# Patient Record
Sex: FEMALE | Race: BLACK OR AFRICAN AMERICAN | NOT HISPANIC OR LATINO
[De-identification: names, ages, dates, MRNs, and addresses within clinical notes are randomized per-mention and may not be internally consistent; named-entity substitution may affect disease eponyms.]

---

## 2017-12-27 ENCOUNTER — APPOINTMENT (OUTPATIENT)
Dept: UROLOGY | Facility: CLINIC | Age: 77
End: 2017-12-27
Payer: COMMERCIAL

## 2017-12-27 VITALS
OXYGEN SATURATION: 94 % | HEIGHT: 62 IN | TEMPERATURE: 98.2 F | WEIGHT: 211 LBS | DIASTOLIC BLOOD PRESSURE: 84 MMHG | SYSTOLIC BLOOD PRESSURE: 140 MMHG | HEART RATE: 90 BPM | BODY MASS INDEX: 38.83 KG/M2

## 2017-12-27 DIAGNOSIS — Z80.0 FAMILY HISTORY OF MALIGNANT NEOPLASM OF DIGESTIVE ORGANS: ICD-10-CM

## 2017-12-27 DIAGNOSIS — Z86.79 PERSONAL HISTORY OF OTHER DISEASES OF THE CIRCULATORY SYSTEM: ICD-10-CM

## 2017-12-27 DIAGNOSIS — Z78.9 OTHER SPECIFIED HEALTH STATUS: ICD-10-CM

## 2017-12-27 PROCEDURE — 99203 OFFICE O/P NEW LOW 30 MIN: CPT

## 2017-12-27 RX ORDER — OMEGA-3-ACID ETHYL ESTERS CAPSULES 1 G/1
1 CAPSULE, LIQUID FILLED ORAL
Qty: 120 | Refills: 0 | Status: ACTIVE | COMMUNITY
Start: 2017-03-08

## 2017-12-27 RX ORDER — LEVOTHYROXINE SODIUM 0.1 MG/1
100 TABLET ORAL
Qty: 30 | Refills: 0 | Status: ACTIVE | COMMUNITY
Start: 2017-05-11

## 2017-12-27 RX ORDER — TROSPIUM CHLORIDE 20 MG/1
20 TABLET, FILM COATED ORAL
Refills: 0 | Status: ACTIVE | COMMUNITY

## 2017-12-27 RX ORDER — IBUPROFEN 800 MG/1
800 TABLET, FILM COATED ORAL
Qty: 90 | Refills: 0 | Status: ACTIVE | COMMUNITY
Start: 2017-03-08

## 2017-12-27 RX ORDER — NYSTATIN AND TRIAMCINOLONE ACETONIDE 100000; 1 MG/G; MG/G
100000-0.1 CREAM TOPICAL
Refills: 0 | Status: ACTIVE | COMMUNITY

## 2017-12-27 RX ORDER — HYDROCHLOROTHIAZIDE 25 MG/1
25 TABLET ORAL
Refills: 0 | Status: ACTIVE | COMMUNITY

## 2017-12-27 RX ORDER — CLONIDINE HYDROCHLORIDE 0.2 MG/1
0.2 TABLET ORAL
Qty: 60 | Refills: 0 | Status: ACTIVE | COMMUNITY
Start: 2017-11-14

## 2017-12-27 RX ORDER — IRBESARTAN 150 MG/1
150 TABLET ORAL
Qty: 30 | Refills: 0 | Status: ACTIVE | COMMUNITY
Start: 2016-12-07

## 2018-01-02 LAB
APPEARANCE: CLEAR
BACTERIA UR CULT: NORMAL
BACTERIA: NEGATIVE
BILIRUBIN URINE: NEGATIVE
BLOOD URINE: NEGATIVE
COLOR: YELLOW
GLUCOSE QUALITATIVE U: NEGATIVE MG/DL
HYALINE CASTS: 1 /LPF
KETONES URINE: NEGATIVE
LEUKOCYTE ESTERASE URINE: NEGATIVE
MICROSCOPIC-UA: NORMAL
NITRITE URINE: NEGATIVE
PH URINE: 6.5
PROTEIN URINE: 300 MG/DL
RED BLOOD CELLS URINE: 4 /HPF
SPECIFIC GRAVITY URINE: 1.02
SQUAMOUS EPITHELIAL CELLS: 6 /HPF
UROBILINOGEN URINE: NEGATIVE MG/DL
WHITE BLOOD CELLS URINE: 6 /HPF

## 2018-03-19 ENCOUNTER — APPOINTMENT (OUTPATIENT)
Dept: UROLOGY | Facility: CLINIC | Age: 78
End: 2018-03-19
Payer: COMMERCIAL

## 2018-03-19 VITALS
DIASTOLIC BLOOD PRESSURE: 80 MMHG | SYSTOLIC BLOOD PRESSURE: 120 MMHG | BODY MASS INDEX: 38.83 KG/M2 | WEIGHT: 211 LBS | HEART RATE: 88 BPM | TEMPERATURE: 98.4 F | HEIGHT: 62 IN | RESPIRATION RATE: 16 BRPM | OXYGEN SATURATION: 96 %

## 2018-03-19 PROCEDURE — 99214 OFFICE O/P EST MOD 30 MIN: CPT

## 2018-03-19 RX ORDER — METFORMIN HYDROCHLORIDE 500 MG/1
500 TABLET, COATED ORAL
Qty: 180 | Refills: 0 | Status: DISCONTINUED | COMMUNITY
Start: 2017-02-13 | End: 2018-03-19

## 2018-03-20 LAB
APPEARANCE: CLEAR
BACTERIA: NEGATIVE
BILIRUBIN URINE: NEGATIVE
BLOOD URINE: NEGATIVE
COLOR: YELLOW
GLUCOSE QUALITATIVE U: NEGATIVE MG/DL
HYALINE CASTS: 0 /LPF
KETONES URINE: NEGATIVE
LEUKOCYTE ESTERASE URINE: NEGATIVE
MICROSCOPIC-UA: NORMAL
NITRITE URINE: NEGATIVE
PH URINE: 5
PROTEIN URINE: ABNORMAL MG/DL
RED BLOOD CELLS URINE: 3 /HPF
SPECIFIC GRAVITY URINE: 1.02
SQUAMOUS EPITHELIAL CELLS: 10 /HPF
UROBILINOGEN URINE: NEGATIVE MG/DL
WHITE BLOOD CELLS URINE: 7 /HPF

## 2018-04-18 ENCOUNTER — OUTPATIENT (OUTPATIENT)
Dept: OUTPATIENT SERVICES | Facility: HOSPITAL | Age: 78
LOS: 1 days | End: 2018-04-18

## 2018-04-18 DIAGNOSIS — Z00.8 ENCOUNTER FOR OTHER GENERAL EXAMINATION: ICD-10-CM

## 2018-05-08 ENCOUNTER — APPOINTMENT (OUTPATIENT)
Dept: MAMMOGRAPHY | Facility: CLINIC | Age: 78
End: 2018-05-08
Payer: COMMERCIAL

## 2018-05-08 ENCOUNTER — OUTPATIENT (OUTPATIENT)
Dept: OUTPATIENT SERVICES | Facility: HOSPITAL | Age: 78
LOS: 1 days | End: 2018-05-08
Payer: COMMERCIAL

## 2018-05-08 ENCOUNTER — RESULT REVIEW (OUTPATIENT)
Age: 78
End: 2018-05-08

## 2018-05-08 DIAGNOSIS — R92.8 OTHER ABNORMAL AND INCONCLUSIVE FINDINGS ON DIAGNOSTIC IMAGING OF BREAST: ICD-10-CM

## 2018-05-08 PROCEDURE — A4648: CPT

## 2018-05-08 PROCEDURE — 19081 BX BREAST 1ST LESION STRTCTC: CPT

## 2018-05-08 PROCEDURE — 19081 BX BREAST 1ST LESION STRTCTC: CPT | Mod: LT

## 2018-05-08 PROCEDURE — 77065 DX MAMMO INCL CAD UNI: CPT | Mod: 26,LT

## 2018-05-08 PROCEDURE — 77065 DX MAMMO INCL CAD UNI: CPT

## 2018-05-16 ENCOUNTER — APPOINTMENT (OUTPATIENT)
Dept: NEUROLOGY | Facility: CLINIC | Age: 78
End: 2018-05-16
Payer: COMMERCIAL

## 2018-05-16 VITALS
BODY MASS INDEX: 38.28 KG/M2 | DIASTOLIC BLOOD PRESSURE: 111 MMHG | HEART RATE: 70 BPM | WEIGHT: 208 LBS | HEIGHT: 62 IN | SYSTOLIC BLOOD PRESSURE: 195 MMHG

## 2018-05-16 DIAGNOSIS — M75.122 COMPLETE ROTATOR CUFF TEAR OR RUPTURE OF LEFT SHOULDER, NOT SPECIFIED AS TRAUMATIC: ICD-10-CM

## 2018-05-16 DIAGNOSIS — E03.9 HYPOTHYROIDISM, UNSPECIFIED: ICD-10-CM

## 2018-05-16 PROCEDURE — 99204 OFFICE O/P NEW MOD 45 MIN: CPT

## 2018-05-17 PROBLEM — M75.122 COMPLETE TEAR OF LEFT ROTATOR CUFF: Status: RESOLVED | Noted: 2018-05-17 | Resolved: 2018-05-17

## 2018-05-17 PROBLEM — E03.9 PRIMARY HYPOTHYROIDISM: Status: RESOLVED | Noted: 2018-05-17 | Resolved: 2018-05-17

## 2018-05-17 RX ORDER — METFORMIN HYDROCHLORIDE 500 MG/1
500 TABLET, COATED ORAL
Refills: 0 | Status: ACTIVE | COMMUNITY

## 2018-06-18 ENCOUNTER — APPOINTMENT (OUTPATIENT)
Dept: NEUROLOGY | Facility: CLINIC | Age: 78
End: 2018-06-18
Payer: COMMERCIAL

## 2018-06-18 DIAGNOSIS — M65.4 RADIAL STYLOID TENOSYNOVITIS [DE QUERVAIN]: ICD-10-CM

## 2018-06-18 PROCEDURE — 95911 NRV CNDJ TEST 9-10 STUDIES: CPT

## 2018-06-18 PROCEDURE — 95886 MUSC TEST DONE W/N TEST COMP: CPT

## 2018-08-30 ENCOUNTER — APPOINTMENT (OUTPATIENT)
Dept: SURGERY | Facility: CLINIC | Age: 78
End: 2018-08-30

## 2018-10-11 ENCOUNTER — OTHER (OUTPATIENT)
Age: 78
End: 2018-10-11

## 2018-10-11 ENCOUNTER — APPOINTMENT (OUTPATIENT)
Dept: NEUROLOGY | Facility: CLINIC | Age: 78
End: 2018-10-11
Payer: COMMERCIAL

## 2018-10-11 PROCEDURE — 99214 OFFICE O/P EST MOD 30 MIN: CPT

## 2018-10-23 ENCOUNTER — APPOINTMENT (OUTPATIENT)
Dept: MRI IMAGING | Facility: CLINIC | Age: 78
End: 2018-10-23

## 2018-10-30 ENCOUNTER — APPOINTMENT (OUTPATIENT)
Dept: SURGERY | Facility: CLINIC | Age: 78
End: 2018-10-30
Payer: COMMERCIAL

## 2018-10-30 VITALS
HEIGHT: 63.5 IN | SYSTOLIC BLOOD PRESSURE: 179 MMHG | HEART RATE: 74 BPM | WEIGHT: 192.19 LBS | OXYGEN SATURATION: 98 % | TEMPERATURE: 98.3 F | DIASTOLIC BLOOD PRESSURE: 81 MMHG | BODY MASS INDEX: 33.63 KG/M2

## 2018-10-30 VITALS — DIASTOLIC BLOOD PRESSURE: 77 MMHG | SYSTOLIC BLOOD PRESSURE: 184 MMHG

## 2018-10-30 PROCEDURE — 99202 OFFICE O/P NEW SF 15 MIN: CPT

## 2018-11-29 ENCOUNTER — APPOINTMENT (OUTPATIENT)
Dept: UROLOGY | Facility: CLINIC | Age: 78
End: 2018-11-29

## 2019-01-09 ENCOUNTER — APPOINTMENT (OUTPATIENT)
Dept: NEUROLOGY | Facility: CLINIC | Age: 79
End: 2019-01-09
Payer: COMMERCIAL

## 2019-01-09 VITALS — HEIGHT: 63.5 IN | BODY MASS INDEX: 32.55 KG/M2 | WEIGHT: 186 LBS

## 2019-01-09 PROCEDURE — 99214 OFFICE O/P EST MOD 30 MIN: CPT

## 2019-01-10 VITALS — DIASTOLIC BLOOD PRESSURE: 82 MMHG | SYSTOLIC BLOOD PRESSURE: 140 MMHG | HEART RATE: 70 BPM

## 2019-01-10 RX ORDER — ASPIRIN 81 MG
81 TABLET,CHEWABLE ORAL
Refills: 0 | Status: ACTIVE | COMMUNITY

## 2019-01-10 RX ORDER — ATORVASTATIN CALCIUM 10 MG/1
10 TABLET, FILM COATED ORAL
Qty: 30 | Refills: 0 | Status: ACTIVE | COMMUNITY
Start: 2018-11-26

## 2019-01-10 RX ORDER — SODIUM SULFATE, POTASSIUM SULFATE, MAGNESIUM SULFATE 17.5; 3.13; 1.6 G/ML; G/ML; G/ML
17.5-3.13-1.6 SOLUTION, CONCENTRATE ORAL
Qty: 354 | Refills: 0 | Status: ACTIVE | COMMUNITY
Start: 2018-12-05

## 2019-01-10 NOTE — REVIEW OF SYSTEMS
[Feeling Poorly] : feeling poorly [Numbness] : numbness [Tingling] : tingling [Difficulty Walking] : difficulty walking [As Noted in HPI] : as noted in HPI [Arthralgias] : arthralgias [Joint Pain] : joint pain [Negative] : Endocrine

## 2019-01-10 NOTE — DISCUSSION/SUMMARY
[FreeTextEntry1] : Opinion\par \par The patient has shoulder joint disease on the right and was advised to discuss the matter with her internist so that the proper referral can be provided for orthopedic evaluation.\par \par Patient has joint pains, posterior hamstring pains, diabetes and diabetic peripheral neuropathy with minimal involvement of the position sense and accounting for her mild ataxia and was advised extensive blood tests and prescriptions were provided to be facilitated by her internist and we forward me the reports of his consultation and referral to orthopedic physician.\par \par Patient was advised to have good diabetic control gentle stretching exercises and to return back for followup in approximately 6-8 weeks following the blood tests and medical evaluation. The patient understands and the matter was discussed in the presence of my fellow who was present throughout this evaluation.

## 2019-01-10 NOTE — PHYSICAL EXAM
[FreeTextEntry1] : Vital signs recorded and unremarkable. There is no carotid bruit, thyromegaly or lymphadenopathy. Chest is clear and heart sounds are normal. Pedal pulsations are normal. Cervical spine is normal without any spine tenderness. Straight leg raising test is negative and lumbar spine is without tenderness. There is no muscle tenderness. There is evidence of arthritis of the right shoulder joint with restricted range of motion and local tenderness over the shoulder joint. There his pain experienced in hamstring muscles bilaterally on knee flexion and extension without positive straight leg raising test.\par \par Neurological examination\par \par The patient is alert oriented and has no cognitive or language or behavioral dysfunction. Cranial nerve examination is normal and there is no papilledema and visual fields are normal and there is no facial weakness. Bulbar functions are intact. Motor evaluation to be +4/5 strength of the right shoulder girdle muscles with pain. Reflexes are symmetric except for absent ankle jerks and there is no Babinski sign. Lower extremity strength is completely normal and there is decreased vibration and pin perception in the feet attributed to diabetic distal neuropathy and she is currently taking metformin. Her gait is slightly antalgic and uses a cane. Romberg sign is negative.

## 2019-01-10 NOTE — HISTORY OF PRESENT ILLNESS
[FreeTextEntry1] : Ms.. Esquivel returns for followup evaluation and discussed the results of EMG studies which revealed peripheral neuropathy and lumbar spine MRI was negative other than osteoarthritic disease without any significant stenosis and was advised that she has mild bilateral carpal tunnel syndrome accounting for some of the symptoms and the fingers.\par \par The patient stated that she has been and discomfort in the posterior thighs knees and ankles and has significant pain on movement of the right shoulder joint but denied any significant neck pain or back pain and today denied any radicular symptoms and there is no focal or lateralized weakness, bowel or bladder dysfunction and denied any headache diplopia dysarthria dysphagia or dyspnea. Sitting on hard surfaces causes posterior thigh discomfort but does not give a clear history of radicular disease. Review of systems is unremarkable.\par \par I reviewed her medications and allergies.

## 2019-01-26 LAB
ALBUMIN SERPL ELPH-MCNC: 4.4 G/DL
ALP BLD-CCNC: 63 U/L
ALT SERPL-CCNC: 15 U/L
ANION GAP SERPL CALC-SCNC: 16 MMOL/L
AST SERPL-CCNC: 12 U/L
BILIRUB SERPL-MCNC: 0.5 MG/DL
BUN SERPL-MCNC: 14 MG/DL
CALCIUM SERPL-MCNC: 10 MG/DL
CHLORIDE SERPL-SCNC: 104 MMOL/L
CO2 SERPL-SCNC: 23 MMOL/L
CREAT SERPL-MCNC: 1.11 MG/DL
CRP SERPL-MCNC: 0.49 MG/DL
FOLATE SERPL-MCNC: 12.4 NG/ML
GLUCOSE SERPL-MCNC: 120 MG/DL
HBA1C MFR BLD HPLC: 6.1 %
POTASSIUM SERPL-SCNC: 4.1 MMOL/L
PROT SERPL-MCNC: 7.6 G/DL
SODIUM SERPL-SCNC: 143 MMOL/L
VIT B12 SERPL-MCNC: 723 PG/ML

## 2019-01-30 LAB
ALBUMIN MFR SERPL ELPH: 51.4 %
ALBUMIN SERPL-MCNC: 3.9 G/DL
ALBUMIN/GLOB SERPL: 1.1 RATIO
ALPHA1 GLOB MFR SERPL ELPH: 4.5 %
ALPHA1 GLOB SERPL ELPH-MCNC: 0.3 G/DL
ALPHA2 GLOB MFR SERPL ELPH: 9.1 %
ALPHA2 GLOB SERPL ELPH-MCNC: 0.7 G/DL
B-GLOBULIN MFR SERPL ELPH: 15.4 %
B-GLOBULIN SERPL ELPH-MCNC: 1.2 G/DL
DEPRECATED KAPPA LC FREE/LAMBDA SER: 1.73 RATIO
GAMMA GLOB FLD ELPH-MCNC: 1.5 G/DL
GAMMA GLOB MFR SERPL ELPH: 19.6 %
IGA SER QL IEP: 386 MG/DL
IGG SER QL IEP: 1519 MG/DL
IGM SER QL IEP: 121 MG/DL
INTERPRETATION SERPL IEP-IMP: NORMAL
KAPPA LC CSF-MCNC: 1.39 MG/DL
KAPPA LC SERPL-MCNC: 2.4 MG/DL
M PROTEIN SPEC IFE-MCNC: NORMAL
PROT SERPL-MCNC: 7.6 G/DL
PROT SERPL-MCNC: 7.6 G/DL

## 2019-03-14 ENCOUNTER — APPOINTMENT (OUTPATIENT)
Dept: NEUROLOGY | Facility: CLINIC | Age: 79
End: 2019-03-14
Payer: COMMERCIAL

## 2019-03-14 VITALS
WEIGHT: 190 LBS | BODY MASS INDEX: 33.25 KG/M2 | DIASTOLIC BLOOD PRESSURE: 86 MMHG | HEART RATE: 72 BPM | HEIGHT: 63.5 IN | SYSTOLIC BLOOD PRESSURE: 140 MMHG

## 2019-03-14 PROCEDURE — 99214 OFFICE O/P EST MOD 30 MIN: CPT

## 2019-03-15 NOTE — PHYSICAL EXAM
[General Appearance - Alert] : alert [General Appearance - In No Acute Distress] : in no acute distress [Oriented To Time, Place, And Person] : oriented to person, place, and time [Impaired Insight] : insight and judgment were intact [Affect] : the affect was normal [Person] : oriented to person [Place] : oriented to place [Time] : oriented to time [Concentration Intact] : normal concentrating ability [Visual Intact] : visual attention was ~T not ~L decreased [Naming Objects] : no difficulty naming common objects [Repeating Phrases] : no difficulty repeating a phrase [Writing A Sentence] : no difficulty writing a sentence [Fluency] : fluency intact [Comprehension] : comprehension intact [Reading] : reading intact [Past History] : adequate knowledge of personal past history [Cranial Nerves Optic (II)] : visual acuity intact bilaterally,  visual fields full to confrontation, pupils equal round and reactive to light [Cranial Nerves Oculomotor (III)] : extraocular motion intact [Cranial Nerves Trigeminal (V)] : facial sensation intact symmetrically [Cranial Nerves Facial (VII)] : face symmetrical [Cranial Nerves Vestibulocochlear (VIII)] : hearing was intact bilaterally [Cranial Nerves Glossopharyngeal (IX)] : tongue and palate midline [Cranial Nerves Accessory (XI - Cranial And Spinal)] : head turning and shoulder shrug symmetric [Cranial Nerves Hypoglossal (XII)] : there was no tongue deviation with protrusion [Motor Tone] : muscle tone was normal in all four extremities [Motor Strength] : muscle strength was normal in all four extremities [No Muscle Atrophy] : normal bulk in all four extremities [Sensation Tactile Decrease] : light touch was intact [Abnormal Walk] : normal gait [Balance] : balance was intact [2+] : Patella left 2+ [0] : Ankle jerk left 0 [FreeTextEntry1] : Gen. examination-vital signs are recorded and unremarkable. There is no carotid bruit, thyromegaly or lymphadenopathy. Chest is clear heart sounds are normal. Abdomen is soft and there is no tenderness. Cervical and lumbar spine range of motion is normal without any spine tenderness or muscle spasm. Her knee and shoulder joint range of motion is also normal without any local tenderness or muscle spasms. Straight raising test is negative. Pedal pulsations are normal. There are no signs of meningeal irritation.\par \par Neurological examination is normal except for trace ankle reflexes and today there is no evidence of significant sensory motor dysfunction or features of carpal tunnel syndrome. [Past-pointing] : there was no past-pointing [Tremor] : no tremor present [Plantar Reflex Right Only] : normal on the right [Plantar Reflex Left Only] : normal on the left

## 2019-03-15 NOTE — HISTORY OF PRESENT ILLNESS
[FreeTextEntry1] :  Ms. Esquivel returns for followup evaluation and stated that she has been much better and was evaluated by a pain specialist who gave her injections of the knee and shoulders and that she has improved significantly and no longer uses a cane and is doing physical therapy and stretching exercises. She also lost weight of 10 pounds and feels better. She had radiologic investigations confirming arthritic disease. She has history of diabetes with neuropathy and bilateral carpal tunnel syndrome but she stated that neither the carpal tunnel no other peripheral neuropathic disease has been bothersome and there is no persistent tingling numbness weakness or any gait difficulty and overall she has done very well with her physicians. Her diabetes is under good control.\par \par Review of systems is unremarkable and she denied any headache diplopia or dysarthria or dysphagia or dyspnea and there is no ataxia or urgency incontinence. No chest or abdominal symptoms.\par \par I reviewed her medications and allergies and his mind him past medical history.

## 2019-03-25 ENCOUNTER — APPOINTMENT (OUTPATIENT)
Dept: ORTHOPEDIC SURGERY | Facility: CLINIC | Age: 79
End: 2019-03-25
Payer: COMMERCIAL

## 2019-04-01 ENCOUNTER — APPOINTMENT (OUTPATIENT)
Dept: ORTHOPEDIC SURGERY | Facility: CLINIC | Age: 79
End: 2019-04-01
Payer: COMMERCIAL

## 2019-04-01 DIAGNOSIS — M75.31 CALCIFIC TENDINITIS OF RIGHT SHOULDER: ICD-10-CM

## 2019-04-01 PROCEDURE — 73030 X-RAY EXAM OF SHOULDER: CPT | Mod: RT

## 2019-04-01 PROCEDURE — 99204 OFFICE O/P NEW MOD 45 MIN: CPT

## 2019-04-01 NOTE — DISCUSSION/SUMMARY
[de-identified] : Discussed findings of today's exam and possible causes of patient's pain.  Educated patient on their most probable diagnosis of right shoulder impingement with calcific tendinopathy.  Reviewed possible courses of treatment, and we collaboratively decided best course of treatment at this time will include conservative management. Patient has artery had right subacromial cortisone injection provided by an outside provider, she did not find much relief from this injection, does not wish to undergo repeat injection today.  Patient started on a course of oral NSAIDs, prescription given for Mobic.  Patient will be started on a course of physical therapy to restore normal range of motion and strength as tolerated.  Follow up as needed.  Patient appreciates and agrees with current plan.\par \par This note was generated using dragon medical dictation software.  A reasonable effort has been made for proofreading its contents, but typos may still remain.  If there are any questions or points of clarification needed please notify my office.

## 2019-04-01 NOTE — PHYSICAL EXAM
[de-identified] : Constitutional: Well-nourished, well-developed, No acute distress\par Respiratory:  Good respiratory effort, no SOB\par Lymphatic: No regional lymphadenopathy, no lymphedema\par Psychiatric: Pleasant and normal affect, alert and oriented x3\par Skin: Clean dry and intact B/L UE/LE\par Musculoskeletal: normal except where as noted in regional exam\par \par \par Left Shoulder:\par APPEARANCE: no marked deformities, no swelling or malalignment\par POSITIVE TENDERNESS: none\par NONTENDER: supraspinatus, infraspinatus, teres minor, LH biceps, anterior and posterior capsule, AC joint\par ROM: full & painless, no scapular winging or dyskinesia present\par RESISTIVE TESTING: painless 5/5 resisted flex/ext, empty can/ER/IR, horizontal abd/add \par SPECIAL TESTS: neg Drop Arm, neg Empty Can, neg Rodriguez/Neers, neg Mendoza's, neg Speeds, neg Apprehension, neg cross arm adduction, neg apley's scratch test\par Vasc: 2+ radial pulse\par Neuro: AIN, PIN, Ulnar nerve intact to motor, DTRs 2+/4 biceps, triceps, brachioradialis\par Sensation: Intact to light touch throughout\par B/L Elbows:  No asymmetry, malalignment, or swelling, Full ROM, 5/5 strength in flexion/ext, pronation/supination, Joints stable\par B/L Wrist and Hand:  No asymmetry, malalignment, or swelling, Full ROM, 5/5 strength in wrist and long finger flexion/ext, radial/ulnar deviation, Joints stable\par \par Right Shoulder:\par APPEARANCE: no marked deformities, no swelling or malalignment\par POSITIVE TENDERNESS: supraspinatus, long head biceps tendon\par NONTENDER:  infraspinatus, teres minor. biceps. anterior and posterior capsule. AC joint. \par ROM: full with mild painful arc past 60 degrees, no scapular winging or dyskinesia present\par RESISTIVE TESTING: MMT 4+/5 ER, Flexion and Empty can, 5/5 IR. painless 5/5 resisted ext, horizontal abd/add \par SPECIAL TESTS: + Rodriguez and Neers, mildly + cross arm adduction, + Speeds, neg Mendoza's, neg Drop Arm, neg Apprehension. neg apley's scratch test\par Vasc: 2+ radial pulse\par Neuro: AIN, PIN, Ulnar nerve intact to motor, DTRs 2+/4 biceps, triceps, brachioradialis\par Sensation: Intact to light touch throughout\par  [de-identified] : The following radiographs were ordered and read by me during this patient's visit. I reviewed each radiograph in detail with the patient and discussed the findings as highlighted below. \par \par 3 views of the right shoulder were obtained today that show degenerative changes and evidence of mild GH osteoarthritis.  There is a noted calcium deposit seen in the subacromial space.  No fracture, or dislocation seen at this time. There is no malalignment. No other obvious osseous abnormality. Otherwise unremarkable.\par \par

## 2019-04-01 NOTE — HISTORY OF PRESENT ILLNESS
[de-identified] : Patient is here for right shoulder pain that began several years ago without inciting event but has been progressively getting worse. She has taken Ibuprofen to treat this pain with moderate relief. She has seen another physician for this pain but they have not prescribed her any treatments. Denies N/T/R/Prior injury.

## 2019-04-30 ENCOUNTER — RX RENEWAL (OUTPATIENT)
Age: 79
End: 2019-04-30

## 2019-05-28 ENCOUNTER — RX RENEWAL (OUTPATIENT)
Age: 79
End: 2019-05-28

## 2019-07-01 ENCOUNTER — RX RENEWAL (OUTPATIENT)
Age: 79
End: 2019-07-01

## 2019-07-01 RX ORDER — MELOXICAM 7.5 MG/1
7.5 TABLET ORAL
Qty: 30 | Refills: 0 | Status: ACTIVE | COMMUNITY
Start: 2019-04-01 | End: 1900-01-01

## 2019-07-15 ENCOUNTER — APPOINTMENT (OUTPATIENT)
Dept: NEUROLOGY | Facility: CLINIC | Age: 79
End: 2019-07-15
Payer: COMMERCIAL

## 2019-07-15 VITALS
HEIGHT: 63.5 IN | DIASTOLIC BLOOD PRESSURE: 103 MMHG | SYSTOLIC BLOOD PRESSURE: 184 MMHG | WEIGHT: 190 LBS | HEART RATE: 73 BPM | BODY MASS INDEX: 33.25 KG/M2

## 2019-07-15 DIAGNOSIS — M54.16 RADICULOPATHY, LUMBAR REGION: ICD-10-CM

## 2019-07-15 PROCEDURE — 99214 OFFICE O/P EST MOD 30 MIN: CPT

## 2019-07-16 PROBLEM — M54.16 LUMBAR RADICULOPATHY: Status: ACTIVE | Noted: 2018-06-18

## 2019-07-16 NOTE — DISCUSSION/SUMMARY
[FreeTextEntry1] : Opinion-the patient has mild carpal tunnel syndrome and is currently asymptomatic with good control of diabetes and has mild weakness of distal polyneuropathy attributed to diabetes but there has not been any progression and was advised to control her diabetes strictly take multivitamins and proper nutrition. Her major symptom is left knee arthritis and was advised to seek orthopedic opinion and appropriate treatment. Followup appointment has been suggested in approximately 6 months on a p.r.n. basis. I also discussed the matter with her son and she will comply.

## 2019-07-16 NOTE — HISTORY OF PRESENT ILLNESS
[FreeTextEntry1] : Ms. he returns back for followup evaluation and has history of mild right carpal tunnel syndrome and moderate diabetic polyneuropathy and stated that she has good control of diabetes and the symptoms of carpal tunnel syndrome have largely subsided without any treatment. She has history of hypertension which is not adequately controlled even though she is on several medications and today her blood pressure was elevated and I repeated it and it came down and she stated that she always had somewhat elevated blood pressure when she is evaluated in the office of physicians and during my evaluation her blood pressure was 140/88. She was advised to discuss this matter with her internist for adequate control of hypertension.\par \par Her current complaints consist of left hip thigh and knee pain without radicular symptoms and denied any significant low back pain and has been evaluated by her physician with orthopedic surgeon who injected her left knee which gave her a transient relief and was advised to seek continued orthopedic evaluation. She has no radicular symptoms of proximal muscle weakness and no features of diabetic amyotrophy. There is also collated joint pain.\par \par Review of systems is unremarkable. I reviewed her medications and allergies.

## 2019-07-16 NOTE — PHYSICAL EXAM
[FreeTextEntry1] : Gen. examination-vital signs are recorded and unremarkable. There is no carotid bruit, thyromegaly or lymphadenopathy. Chest is clear heart sounds are normal. Pedal pulsations are present. Cervical and lumbar spine range of motion is normal and there is no lumbar spine tenderness or paravertebral muscle spasm. Straight leg raising test is negative but her knee joints movements of painful on the left referring to the proximal thigh he and pain behind her left knee without any palpable mass or swelling.\par \par Neurological examination revealed normal mental functions and cranial nerve examination is unremarkable. She has no Tinel's sign at wrist today and motor strength in the upper extremities is symmetric and normal with symmetric 2+ reflexes and normal sensations. In the lower extremities her knee reflexes are 2+ ankle swelling 1+ bilaterally and there is no Babinski sign. Vibration sense is absent in the feet and reduced up to the knees with decreased pain perception in the toes that S. position sense is normal and there is no dermatomal pattern. Motor strength in the lower extremities symmetric and normal. Gait is slightly antalgic with the left knee guarding. Romberg sign is negative.

## 2019-07-16 NOTE — REVIEW OF SYSTEMS
[Numbness] : numbness [Tingling] : tingling [As Noted in HPI] : as noted in HPI [Arthralgias] : arthralgias [Joint Pain] : joint pain [Negative] : Heme/Lymph

## 2020-01-15 ENCOUNTER — APPOINTMENT (OUTPATIENT)
Dept: NEUROLOGY | Facility: CLINIC | Age: 80
End: 2020-01-15

## 2020-05-18 ENCOUNTER — OUTPATIENT (OUTPATIENT)
Dept: OUTPATIENT SERVICES | Facility: HOSPITAL | Age: 80
LOS: 1 days | End: 2020-05-18
Payer: COMMERCIAL

## 2020-05-18 ENCOUNTER — APPOINTMENT (OUTPATIENT)
Dept: ULTRASOUND IMAGING | Facility: CLINIC | Age: 80
End: 2020-05-18
Payer: COMMERCIAL

## 2020-05-18 DIAGNOSIS — Z00.8 ENCOUNTER FOR OTHER GENERAL EXAMINATION: ICD-10-CM

## 2020-05-18 PROCEDURE — 93971 EXTREMITY STUDY: CPT

## 2020-05-18 PROCEDURE — 93971 EXTREMITY STUDY: CPT | Mod: 26,LT

## 2020-06-19 ENCOUNTER — APPOINTMENT (OUTPATIENT)
Dept: UROGYNECOLOGY | Facility: CLINIC | Age: 80
End: 2020-06-19
Payer: COMMERCIAL

## 2020-06-19 VITALS
BODY MASS INDEX: 33.49 KG/M2 | DIASTOLIC BLOOD PRESSURE: 80 MMHG | WEIGHT: 189 LBS | HEIGHT: 63 IN | SYSTOLIC BLOOD PRESSURE: 170 MMHG | TEMPERATURE: 95.5 F

## 2020-06-19 LAB
BILIRUB UR QL STRIP: NORMAL
CLARITY UR: CLEAR
COLLECTION METHOD: NORMAL
GLUCOSE UR-MCNC: NORMAL
HCG UR QL: 0.2 EU/DL
HGB UR QL STRIP.AUTO: NORMAL
KETONES UR-MCNC: NORMAL
LEUKOCYTE ESTERASE UR QL STRIP: NORMAL
NITRITE UR QL STRIP: NORMAL
PH UR STRIP: 5.5
PROT UR STRIP-MCNC: NORMAL
SP GR UR STRIP: 1.02

## 2020-06-19 PROCEDURE — 51701 INSERT BLADDER CATHETER: CPT

## 2020-06-19 PROCEDURE — 99204 OFFICE O/P NEW MOD 45 MIN: CPT | Mod: 25

## 2020-06-19 NOTE — HISTORY OF PRESENT ILLNESS
[Urinary Frequency] : no [Feelings Of Urinary Urgency] : moderate [x3+] : nocturia three or more  times a night [Urinary Tract Infection] : severe [] : years ago [Constipation Obstructed Defecation] : mild [Unable To Restrain Bowel Movement] : severe [de-identified] : has tried Oxybutynin and trospium [de-identified] : 4-5 times a night [FreeTextEntry6] : takes stool softener which improves symptoms [FreeTextEntry1] : \par Sujatha presents with urgency urinary incontinence for many years. She was started on Oxybutynin which she discontinued after 1 year due to side effect of memory loss. She reports symptoms were improved on medication. She was then changed to trospium 20 mg bid and has taken this for 2 years. She reports ~50% improvement in symptoms with medication however still has to use 4 pads per day and wakes up 4-5 times a night. She reports symptoms are having a huge effect on her QOL. \par \par We reviewed her daily fluid intake: 3-4 c coffee, 1-2 glasses of wine, 1 glass soda every other day, 2 cups of tea a week\par \par PMH: HTN, asthma, constipation, DM, thyroid disorder\par PSH: none\par Social History: , nonsmoker, retired\par \par

## 2020-06-19 NOTE — ADDENDUM
[FreeTextEntry1] : We discussed possible etiologies of her symptoms including overactive bladder. She consumes multiple bladder irritants and we reviewed the importance of fluid modifications. I recommend she start behavioral and fluid modifications and bladder training. Written instructions were provided.  She will try this for 4-6 weeks. If she has no improvement in her symptoms, we will proceed with further workup including urodynamic testing and cystoscopy. SHe will RTO in 6 weeks for follow up, or sooner if issues arise. Urine sent today for micro UA and culture.\par \par The following was provided to her in written form and reviewed extensively. Patient was given a copy to take home: \par For Urinary Symptoms:\par **Total fluids: 34-50 oz (1-1.5 Liters) per day\par   -Ex. 8 oz coffee or tea (NOT BOTH!), 2-3 bottles of water (Each bottle is 16.9 oz). No flavoring added. No seltzer or Pelligrino!\par  -Drink slowly throughout day, no binge drinking (each bottle of water should take you hours, not minutes)\par **Avoid: coffee, tea, alcohol, carbonation (soda, seltzer), spicy foods, citrus, artificial sweeteners, chocolate\par **Stop eating and drinking 2-3 hours before bedtime (sips ok)\par \par -Try the above fluid changes and bladder training for 4 weeks\par \par Continue Trospium twice daily. Return to office in 4 weeks. If symptoms do not improve, will get further workup. \par \par

## 2020-06-19 NOTE — REASON FOR VISIT
[Initial Visit ___] : an initial visit for [unfilled] [Questionnaire Received] : Patient questionnaire received [Urinary Incontinence] : urinary incontinence [Other: ____] : [unfilled]

## 2020-06-19 NOTE — PHYSICAL EXAM
[Chaperone Present] : A chaperone was present in the examining room during all aspects of the physical examination [Vulvar Atrophy] : vulvar atrophy [Labia Minora] : were normal [Labia Majora] : were normal [Normal Appearance] : general appearance was normal [Atrophy] : atrophy [Normal] : no abnormalities [Exam Deferred] : was deferred [FreeTextEntry1] : General: Well, appearing. Alert and orientated. No acute distress\par HEENT: Normocephalic, atraumatic and extraocular muscles appear to be intact \par Neck: Full range of motion, no obvious lymphadenopathy, deformities, or masses noted \par Respiratory: Speaking in full sentences comfortably, normal work of breathing and no cough during visit\par Musculoskeletal: active full range of motion in extremities \par Extremities: No upper extremity edema noted\par Skin: no obvious rash or skin lesions\par Neuro: Orientated X 3, speech is fluent, normal rate\par Psych: Normal mood and affect \par   [Tenderness] : ~T no ~M abdominal tenderness observed [Distended] : not distended [H/Smegaly] : no hepatosplenomegaly

## 2020-07-29 ENCOUNTER — APPOINTMENT (OUTPATIENT)
Dept: UROGYNECOLOGY | Facility: CLINIC | Age: 80
End: 2020-07-29
Payer: COMMERCIAL

## 2020-07-29 DIAGNOSIS — R35.1 NOCTURIA: ICD-10-CM

## 2020-07-29 PROCEDURE — 99213 OFFICE O/P EST LOW 20 MIN: CPT

## 2020-07-29 NOTE — DISCUSSION/SUMMARY
[FreeTextEntry1] : \par -Perform behavioral and fluid modifications, avoid bladder irritants\par -Urodynamic testing-stop trospium 3 days prior to test\par -Cystoscopy\par \par The following was provided to her in written form and reviewed extensively. Patient was given a copy to take home: \par For Urinary Symptoms:\par **Total fluids: 34-50 oz (1-1.5 Liters) per day\par   -Ex. 8 oz coffee, 2-3 bottles of water (Each bottle is 16.9 oz). No flavoring added. No seltzer or Pelligrino!\par  -Drink slowly throughout day, no binge drinking (each bottle of water should take you hours, not minutes)\par **Avoid: coffee, tea, alcohol, carbonation (soda, seltzer), spicy foods, citrus, artificial sweeteners, chocolate\par **Stop eating and drinking 2-3 hours before bedtime (sips ok)\par \par Work up: \par 1.	Urodynamic testing-evaluate bladder function and diagnose leakage conditions. STOP Trospium 3 days prior to test. You can restart after test is over\par \par 2.	Cystoscopy-look inside bladder with small camera. Test takes 1-3 minutes. No restrictions before or after test.\par

## 2020-07-29 NOTE — HISTORY OF PRESENT ILLNESS
[Unable To Restrain Bowel Movement] : severe [Urinary Frequency] : no [Feelings Of Urinary Urgency] : mild [x3+] : nocturia three or more  times a night [Urinary Tract Infection] : moderate [Constipation Obstructed Defecation] : mild [] : years ago [de-identified] : has tried Oxybutynin and trospium [de-identified] : some improvement with behavioral and fluid modifications  [de-identified] : some improvement with behavioral and fluid modifications  [FreeTextEntry6] : takes stool softener which improves symptoms [de-identified] : 4-5 times a night [FreeTextEntry1] : \par Sujatha presents for follow up with urgency urinary incontinence for many years. Since her last visit she has been performing behavioral and fluid modifications and taking Trospium 20 mg bid. She reports not following all of the fluid modifications, we reviewed her fluid intake. She continues to consume some bladder irritants. She denies any improvement in her nocturia or UUI. She has some improvement in daytime frequency and urgency. She was in ER at [facility] 2 weeks ago with possible infection, no records available today. She has tried oxybutynin and trospium for her urinary symptoms without significant improvement. \par \par \par PMH: HTN, asthma, constipation, DM, thyroid disorder\par PSH: none\par Social History: , nonsmoker, retired\par \par

## 2020-08-20 ENCOUNTER — APPOINTMENT (OUTPATIENT)
Dept: UROGYNECOLOGY | Facility: CLINIC | Age: 80
End: 2020-08-20
Payer: COMMERCIAL

## 2020-08-20 ENCOUNTER — OUTPATIENT (OUTPATIENT)
Dept: OUTPATIENT SERVICES | Facility: HOSPITAL | Age: 80
LOS: 1 days | End: 2020-08-20
Payer: COMMERCIAL

## 2020-08-20 DIAGNOSIS — Z01.818 ENCOUNTER FOR OTHER PREPROCEDURAL EXAMINATION: ICD-10-CM

## 2020-08-20 PROCEDURE — 51797 INTRAABDOMINAL PRESSURE TEST: CPT | Mod: 26

## 2020-08-20 PROCEDURE — 51729 CYSTOMETROGRAM W/VP&UP: CPT

## 2020-08-20 PROCEDURE — 51729 CYSTOMETROGRAM W/VP&UP: CPT | Mod: 26

## 2020-08-20 PROCEDURE — 51797 INTRAABDOMINAL PRESSURE TEST: CPT

## 2020-08-20 PROCEDURE — 51784 ANAL/URINARY MUSCLE STUDY: CPT

## 2020-08-20 PROCEDURE — 51784 ANAL/URINARY MUSCLE STUDY: CPT | Mod: 26

## 2020-10-08 ENCOUNTER — APPOINTMENT (OUTPATIENT)
Dept: NEUROLOGY | Facility: CLINIC | Age: 80
End: 2020-10-08
Payer: COMMERCIAL

## 2020-10-08 VITALS — TEMPERATURE: 97.2 F

## 2020-10-08 DIAGNOSIS — E11.42 TYPE 2 DIABETES MELLITUS WITH DIABETIC POLYNEUROPATHY: ICD-10-CM

## 2020-10-08 PROCEDURE — 99214 OFFICE O/P EST MOD 30 MIN: CPT

## 2020-10-08 NOTE — PHYSICAL EXAM
[FreeTextEntry1] : Vital signs are recorded and unremarkable.  There is no carotid bruit, thyromegaly or lymphadenopathy.  Neck is supple and there are no signs of meningeal irritation.  Head neck, ear nose and throat is unremarkable.  There is no spine tenderness.  Pedal pulsations are perceptible and there is minimal leg edema.\par \par Mental function examination–the patient is alert cooperative and pleasant and recalls our conversation in the waiting room and recount all the details was oriented to date day and month though made few mistakes about the day but corrected herself.  She remembered the presidents there was no right left confusion, finger agnosia or dyscalculia.  Her attention span did appear to be decreased but language abilities were completely normal.  She did not appear anxious or depressed was pleasant and cooperative.\par \par Cranial nerve examination was unremarkable and her motor evaluation revealed normal 5/5 strength in both upper and lower extremities with normal tone without any paratonia or rigidity there was no Myerson sign or tremor.  Finger-to-nose and heel-to-shin test was normal and reflexes were 1+ each with trace to absent ankle reflexes bilaterally there was no Babinski sign.  Sensory modalities are all intact including position sense to pin perception and vibration was diminished in the feet.  There was no ataxia and Romberg sign was negative.

## 2020-10-08 NOTE — REVIEW OF SYSTEMS
[Memory Lapses or Loss] : memory loss [Decr. Concentrating Ability] : decreased concentrating ability [Numbness] : numbness [Tingling] : tingling [Arthralgias] : arthralgias [As Noted in HPI] : as noted in HPI [Negative] : Heme/Lymph [Confused or Disoriented] : no confusion [Difficulty with Language] : no ~M difficulty with language [Changed Thought Patterns] : no change in thought patterns [Repeating Questions] : no repeated questioning about recent events [Facial Weakness] : no facial weakness [Arm Weakness] : no arm weakness [Hand Weakness] : no hand weakness [Leg Weakness] : no leg weakness [Poor Coordination] : good coordination [Difficulty Writing] : no difficulty writing [Difficulties in Speech] : no speech difficulties [Abnormal Sensation] : no abnormal sensation [Hypersensitivity] : no hypersensitivity [Seizures] : no convulsions [Dizziness] : no dizziness [Fainting] : no fainting [Lightheadedness] : no lightheadedness [Vertigo] : no vertigo [Cluster Headache] : no cluster headache [Migraine Headache] : no migraine headache [Difficulty Walking] : no difficulty walking [Inability to Walk] : able to walk [Ataxia] : no ataxia [Frequent Falls] : not falling [Limping] : not limping

## 2020-10-08 NOTE — HISTORY OF PRESENT ILLNESS
[FreeTextEntry1] : Ms. Esquivel is a 79-year-old lady who has been followed in this office for diabetic peripheral neuropathy and prior history of suspicion of lumbosacral diabetic plexopathy and carpal tunnel syndrome and at the present time other than distal sensory neuropathy there are no neurological symptoms pertaining to diabetes.\par \par Currently she complains of memory loss and stated that I do not experience any problems with my memory language and behavior however my family members pointed out that I am forgetting thanks.  Throughout her previous evaluations there has not been any issues with her memory but she states that this has been ongoing for about 5 years and recently the family is more frequently complaining of her memory loss.  She is however able to carry out most activities of daily living even though she lives with her family and balances her checkbook's does not make any mistakes in writing checks and has no language deficits and is able to express herself clearly there is no apparent anxiety or depression but is bothered by the family members regarding her memory.  She keeps stating that she has memory issues.  There has not been any behavioral problems agitation, paranoia severe anxiety or depression.\par \par I reviewed her past medical history including medications and allergies.  There is no interim past medical history however she had renal infection with fever and encephalopathy requiring admission to the hospital where treatment with antibiotics cleared her infection as well as encephalopathy.\par \par

## 2020-10-08 NOTE — DISCUSSION/SUMMARY
[FreeTextEntry1] : Opinion–the patient has diabetic distal sensory neuropathy which has been stable and nonprogressive and no features of lumbosacral plexopathy or symptomatic carpal tunnel syndrome at this time.  Her major issue is memory problems slowly progressive for the last 5 years but she does not seem to realize it and is only bothered by her family members.  Brief cognitive evaluation does  so memory issues mainly with attention span without language deficits are behavioral problems.  I advised her to obtain MRI of the brain and suspect microvascular disease due to diabetes with an episode of encephalopathy during fever due to kidney infection and after the MRI I will appropriately advise her and if necessary will obtain neurocognitive evaluation and further investigations if necessary.  Detailed evaluation and counseling was provided and she seemed to understand and have fair insight into her problems and will proceed with my advice and contact me after the MRI of the brain and return back for follow-up in 3 months or on a as needed basis.

## 2020-10-21 ENCOUNTER — OUTPATIENT (OUTPATIENT)
Dept: OUTPATIENT SERVICES | Facility: HOSPITAL | Age: 80
LOS: 1 days | End: 2020-10-21
Payer: COMMERCIAL

## 2020-10-21 ENCOUNTER — APPOINTMENT (OUTPATIENT)
Dept: UROGYNECOLOGY | Facility: CLINIC | Age: 80
End: 2020-10-21
Payer: COMMERCIAL

## 2020-10-21 DIAGNOSIS — N39.41 URGE INCONTINENCE: ICD-10-CM

## 2020-10-21 DIAGNOSIS — N32.81 OVERACTIVE BLADDER: ICD-10-CM

## 2020-10-21 DIAGNOSIS — Z01.818 ENCOUNTER FOR OTHER PREPROCEDURAL EXAMINATION: ICD-10-CM

## 2020-10-21 DIAGNOSIS — N32.89 OTHER SPECIFIED DISORDERS OF BLADDER: ICD-10-CM

## 2020-10-21 LAB
BILIRUB UR QL STRIP: NORMAL
CLARITY UR: CLEAR
COLLECTION METHOD: NORMAL
GLUCOSE UR-MCNC: NORMAL
HCG UR QL: 0.2 EU/DL
HGB UR QL STRIP.AUTO: NORMAL
KETONES UR-MCNC: NORMAL
LEUKOCYTE ESTERASE UR QL STRIP: NORMAL
NITRITE UR QL STRIP: NORMAL
PH UR STRIP: 6
PROT UR STRIP-MCNC: NORMAL
SP GR UR STRIP: 1.03

## 2020-10-21 PROCEDURE — 52000 CYSTOURETHROSCOPY: CPT

## 2020-10-21 PROCEDURE — 99213 OFFICE O/P EST LOW 20 MIN: CPT | Mod: 25

## 2020-10-21 NOTE — DISCUSSION/SUMMARY
[FreeTextEntry1] : \par We reviewed her urodynamic and cystoscopy findings extensively. I counseled her of my concerns for bladder cancer and she is scheduling an appointment with Dr Roslyn MORALES. Cytology sent today. We discussed her associated urinary symptoms. I will continue to follow her closely.

## 2020-10-23 ENCOUNTER — APPOINTMENT (OUTPATIENT)
Dept: UROLOGY | Facility: CLINIC | Age: 80
End: 2020-10-23

## 2020-10-23 LAB — URINE CYTOLOGY: NORMAL

## 2020-11-12 ENCOUNTER — OUTPATIENT (OUTPATIENT)
Dept: OUTPATIENT SERVICES | Facility: HOSPITAL | Age: 80
LOS: 1 days | End: 2020-11-12
Payer: COMMERCIAL

## 2020-11-12 ENCOUNTER — APPOINTMENT (OUTPATIENT)
Dept: MRI IMAGING | Facility: CLINIC | Age: 80
End: 2020-11-12
Payer: COMMERCIAL

## 2020-11-12 DIAGNOSIS — F03.90 UNSPECIFIED DEMENTIA WITHOUT BEHAVIORAL DISTURBANCE: ICD-10-CM

## 2020-11-12 PROCEDURE — 70551 MRI BRAIN STEM W/O DYE: CPT | Mod: 26

## 2020-11-12 PROCEDURE — 70551 MRI BRAIN STEM W/O DYE: CPT

## 2020-11-19 ENCOUNTER — APPOINTMENT (OUTPATIENT)
Dept: NEUROLOGY | Facility: CLINIC | Age: 80
End: 2020-11-19
Payer: COMMERCIAL

## 2020-11-19 VITALS — TEMPERATURE: 97 F

## 2020-11-19 DIAGNOSIS — Z00.00 ENCOUNTER FOR GENERAL ADULT MEDICAL EXAMINATION W/OUT ABNORMAL FINDINGS: ICD-10-CM

## 2020-11-19 DIAGNOSIS — E11.49 TYPE 2 DIABETES MELLITUS WITH OTHER DIABETIC NEUROLOGICAL COMPLICATION: ICD-10-CM

## 2020-11-19 PROCEDURE — 99215 OFFICE O/P EST HI 40 MIN: CPT

## 2020-11-19 RX ORDER — DONEPEZIL HYDROCHLORIDE 5 MG/1
5 TABLET ORAL TWICE DAILY
Qty: 60 | Refills: 5 | Status: ACTIVE | COMMUNITY
Start: 2020-11-19 | End: 1900-01-01

## 2020-11-20 ENCOUNTER — APPOINTMENT (OUTPATIENT)
Dept: UROLOGY | Facility: CLINIC | Age: 80
End: 2020-11-20
Payer: COMMERCIAL

## 2020-11-20 PROBLEM — E11.49 TYPE 2 DIABETES MELLITUS WITH OTHER NEUROLOGIC COMPLICATION, WITHOUT LONG-TERM CURRENT USE OF INSULIN: Status: RESOLVED | Noted: 2018-05-17 | Resolved: 2020-11-20

## 2020-11-20 PROCEDURE — 99072 ADDL SUPL MATRL&STAF TM PHE: CPT

## 2020-11-20 PROCEDURE — 99214 OFFICE O/P EST MOD 30 MIN: CPT

## 2020-11-20 RX ORDER — ALENDRONATE SODIUM 70 MG/1
70 TABLET ORAL
Qty: 4 | Refills: 0 | Status: ACTIVE | COMMUNITY
Start: 2020-01-22

## 2020-11-20 RX ORDER — LABETALOL HYDROCHLORIDE 100 MG/1
100 TABLET, FILM COATED ORAL
Qty: 60 | Refills: 0 | Status: ACTIVE | COMMUNITY
Start: 2020-04-23

## 2020-11-20 RX ORDER — AMOXICILLIN 500 MG/1
500 CAPSULE ORAL
Qty: 21 | Refills: 0 | Status: ACTIVE | COMMUNITY
Start: 2020-06-15

## 2020-11-20 RX ORDER — NIFEDIPINE 60 MG/1
60 TABLET, EXTENDED RELEASE ORAL
Qty: 30 | Refills: 0 | Status: ACTIVE | COMMUNITY
Start: 2020-07-12

## 2020-11-20 RX ORDER — NIFEDIPINE 60 MG/1
60 TABLET, FILM COATED, EXTENDED RELEASE ORAL
Qty: 30 | Refills: 0 | Status: ACTIVE | COMMUNITY
Start: 2020-03-30

## 2020-11-20 RX ORDER — LISINOPRIL 40 MG/1
40 TABLET ORAL
Qty: 30 | Refills: 0 | Status: ACTIVE | COMMUNITY
Start: 2020-03-30

## 2020-11-20 RX ORDER — LISINOPRIL 20 MG/1
20 TABLET ORAL
Qty: 30 | Refills: 0 | Status: ACTIVE | COMMUNITY
Start: 2020-10-26

## 2020-11-20 NOTE — HISTORY OF PRESENT ILLNESS
[FreeTextEntry1] : Ms. Esquivel is a 80-year-old very pleasant lady returning for follow-up evaluation and is being followed 5 memory problems and has history of diabetes hypertension high cholesterol and discussed the MRI of her brain which showed microvascular changes diffusely in addition to small lacunar strokes and today I had a detailed conversation regarding stroke risk factors and while she is on aspirin she must discuss all the factors with her internist Dr. Esquivel and stated that she had recent blood tests and was advised to forward the report to my office.\par \par Original consultation urgently that she has recent memory deficits and was restarted on donepezil 5 mg at night after meals and if there are no side effects which were explained she may increase it to 10 mg daily and the prescription was sent to her pharmacy.  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  She denied any other symptoms of peripheral neuropathy has residual symptoms of carpal tunnel syndrome and multiple medical comorbidities which have been controlled by her internist including stroke risk factors.  I reviewed her medications and allergies.

## 2020-11-20 NOTE — REVIEW OF SYSTEMS
[Memory Lapses or Loss] : no memory loss [Decr. Concentrating Ability] : no decrease in concentrating ability [Numbness] : numbness [Tingling] : tingling [As Noted in HPI] : as noted in HPI [Negative] : Heme/Lymph

## 2020-11-20 NOTE — DATA REVIEWED
[No studies available for review at this time.] : No studies available for review at this time. [de-identified] : I reviewed the MRI films and the report with the patient and it showed multi-infarct state with microvascular disease and small lacunar strokes and was advised that she has multiple stroke risk factors including diabetes hypertension and high cholesterol and must be under good control.

## 2020-11-20 NOTE — DISCUSSION/SUMMARY
[FreeTextEntry1] : Opinion–Ms. 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  If there are any side effects he will contact me.  She was advised that I have not confirmed Alzheimer's disease and the use of Aricept is off label.  She understands and wishes to continue the medication.\par \par She has multiple stroke risk factors including risk factors for coronary artery disease because of diabetes hypertension and high cholesterol and was advised to have a strict control of her medical comorbidities and must be under the care of her physician and take a baby aspirin daily.  She has no GI symptoms.\par \par She will return back for follow-up in approximately 3 months but will contact me by phone if there is any change.

## 2020-11-20 NOTE — PHYSICAL EXAM
[General Appearance - Alert] : alert [General Appearance - In No Acute Distress] : in no acute distress [Oriented To Time, Place, And Person] : oriented to person, place, and time [Impaired Insight] : insight and judgment were intact [Affect] : the affect was normal [FreeTextEntry1] : There is historically mild dementia for recent events and was today prescribed Aricept [Person] : oriented to person [Place] : oriented to place [Time] : oriented to time [Concentration Intact] : normal concentrating ability [Visual Intact] : visual attention was ~T not ~L decreased [Naming Objects] : no difficulty naming common objects [Repeating Phrases] : no difficulty repeating a phrase [Writing A Sentence] : no difficulty writing a sentence [Fluency] : fluency intact [Comprehension] : comprehension intact [Reading] : reading intact [Past History] : adequate knowledge of personal past history [Cranial Nerves Optic (II)] : visual acuity intact bilaterally,  visual fields full to confrontation, pupils equal round and reactive to light [Cranial Nerves Oculomotor (III)] : extraocular motion intact [Cranial Nerves Trigeminal (V)] : facial sensation intact symmetrically [Cranial Nerves Facial (VII)] : face symmetrical [Cranial Nerves Vestibulocochlear (VIII)] : hearing was intact bilaterally [Cranial Nerves Glossopharyngeal (IX)] : tongue and palate midline [Cranial Nerves Accessory (XI - Cranial And Spinal)] : head turning and shoulder shrug symmetric [Cranial Nerves Hypoglossal (XII)] : there was no tongue deviation with protrusion [Motor Tone] : muscle tone was normal in all four extremities [Motor Strength] : muscle strength was normal in all four extremities [No Muscle Atrophy] : normal bulk in all four extremities [Sensation Tactile Decrease] : light touch was intact [Abnormal Walk] : normal gait [Balance] : balance was intact [Past-pointing] : there was no past-pointing [Tremor] : no tremor present [2+] : Ankle jerk left 2+ [Plantar Reflex Right Only] : normal on the right [Plantar Reflex Left Only] : normal on the left

## 2020-11-23 NOTE — HISTORY OF PRESENT ILLNESS
[FreeTextEntry1] : 79yo female with cc of bladder tumor. Pt with baseline bothersome urinary frequency. SHe had seen Dr Blanchard in 2017 for this. She was given medication with some benefit. She saw Dr Donohue earlier this year for OAB again. Was placed on trospium with some benefit but still with bother. She had UDS that showed overactivity. Then had cysto that showed incidental note of bladder tumor at L posterior bladder wall. She was referred here. \par \par She denies gross hematuria. No tobacco hx. No exposure hx. No family hx of  malignancy. Daughter and mom with liver ca. Cytology was negative.

## 2020-11-23 NOTE — ASSESSMENT
[FreeTextEntry1] : OAB with incidental note of bladder tumor. Needs eval. Cyto negative. \par --CTU\par --Follow-up after CTU

## 2020-11-25 LAB
APPEARANCE: ABNORMAL
BACTERIA UR CULT: NORMAL
BACTERIA: ABNORMAL
BILIRUBIN URINE: NEGATIVE
BLOOD URINE: NEGATIVE
COLOR: YELLOW
GLUCOSE QUALITATIVE U: NEGATIVE
HYALINE CASTS: 1 /LPF
KETONES URINE: NEGATIVE
LEUKOCYTE ESTERASE URINE: NEGATIVE
MICROSCOPIC-UA: NORMAL
NITRITE URINE: NEGATIVE
PH URINE: 6
PROTEIN URINE: ABNORMAL
RED BLOOD CELLS URINE: 1 /HPF
SPECIFIC GRAVITY URINE: >=1.03
SQUAMOUS EPITHELIAL CELLS: 3 /HPF
UROBILINOGEN URINE: NORMAL
WHITE BLOOD CELLS URINE: 2 /HPF

## 2020-12-04 ENCOUNTER — APPOINTMENT (OUTPATIENT)
Dept: UROLOGY | Facility: CLINIC | Age: 80
End: 2020-12-04

## 2021-01-05 ENCOUNTER — NON-APPOINTMENT (OUTPATIENT)
Age: 81
End: 2021-01-05

## 2021-01-11 ENCOUNTER — APPOINTMENT (OUTPATIENT)
Dept: UROLOGY | Facility: CLINIC | Age: 81
End: 2021-01-11
Payer: COMMERCIAL

## 2021-01-11 VITALS — TEMPERATURE: 97.9 F

## 2021-01-11 PROCEDURE — 99072 ADDL SUPL MATRL&STAF TM PHE: CPT

## 2021-01-11 PROCEDURE — 99214 OFFICE O/P EST MOD 30 MIN: CPT

## 2021-01-12 NOTE — ASSESSMENT
[FreeTextEntry1] : Discussed that this is representative of bladder cancer (TCC) until proven otherwise. Needs eval with resection. Discussed risks of biopsy including but not limited to bleeding, infection, injury to bladder/ureters, bladder perforation, need for perea catheter, need for additional procedures. \par --Medical clearance\par --Plan for TURBT with instillation of mitomycin

## 2021-01-12 NOTE — HISTORY OF PRESENT ILLNESS
[FreeTextEntry1] : 81yo female with cc of bladder tumor. Pt with baseline bothersome urinary frequency. SHe had seen Dr Blanchard in 2017 for this. She was given medication with some benefit. She saw Dr Donohue earlier this year for OAB again. Was placed on trospium with some benefit but still with bother. She had UDS that showed overactivity. Then had cysto that showed incidental note of bladder tumor at L posterior bladder wall. She was referred here. She denies gross hematuria. No tobacco hx. No exposure hx. No family hx of  malignancy. Daughter and mom with liver ca. Cytology was negative. Plan made after intial visit for CTU for uppertract eval. SHe has not followed up. Called pt and she reports issues with memory. Asked to make appointment and bring family with her to help with coordination of care. \par \par Reviewed CT findings today with the patient and her 3 sons. CT shows filling defect in bladder. Bladder wall does not appear to be thickened. No LAD. She continues to deny hematuria. She does however have bothersome urinary sx.

## 2021-01-14 DIAGNOSIS — Z01.818 ENCOUNTER FOR OTHER PREPROCEDURAL EXAMINATION: ICD-10-CM

## 2021-01-15 ENCOUNTER — OUTPATIENT (OUTPATIENT)
Dept: OUTPATIENT SERVICES | Facility: HOSPITAL | Age: 81
LOS: 1 days | End: 2021-01-15
Payer: COMMERCIAL

## 2021-01-15 VITALS
DIASTOLIC BLOOD PRESSURE: 80 MMHG | HEIGHT: 63 IN | OXYGEN SATURATION: 98 % | WEIGHT: 194.01 LBS | SYSTOLIC BLOOD PRESSURE: 140 MMHG | HEART RATE: 58 BPM | TEMPERATURE: 98 F | RESPIRATION RATE: 18 BRPM

## 2021-01-15 DIAGNOSIS — E11.9 TYPE 2 DIABETES MELLITUS WITHOUT COMPLICATIONS: ICD-10-CM

## 2021-01-15 DIAGNOSIS — D49.4 NEOPLASM OF UNSPECIFIED BEHAVIOR OF BLADDER: ICD-10-CM

## 2021-01-15 DIAGNOSIS — I10 ESSENTIAL (PRIMARY) HYPERTENSION: ICD-10-CM

## 2021-01-15 DIAGNOSIS — G47.33 OBSTRUCTIVE SLEEP APNEA (ADULT) (PEDIATRIC): ICD-10-CM

## 2021-01-15 DIAGNOSIS — C67.9 MALIGNANT NEOPLASM OF BLADDER, UNSPECIFIED: ICD-10-CM

## 2021-01-15 LAB
A1C WITH ESTIMATED AVERAGE GLUCOSE RESULT: 5.9 % — HIGH (ref 4–5.6)
ANION GAP SERPL CALC-SCNC: 11 MMOL/L — SIGNIFICANT CHANGE UP (ref 7–14)
BUN SERPL-MCNC: 15 MG/DL — SIGNIFICANT CHANGE UP (ref 7–23)
CALCIUM SERPL-MCNC: 9.7 MG/DL — SIGNIFICANT CHANGE UP (ref 8.4–10.5)
CHLORIDE SERPL-SCNC: 104 MMOL/L — SIGNIFICANT CHANGE UP (ref 98–107)
CO2 SERPL-SCNC: 26 MMOL/L — SIGNIFICANT CHANGE UP (ref 22–31)
CREAT SERPL-MCNC: 1.12 MG/DL — SIGNIFICANT CHANGE UP (ref 0.5–1.3)
ESTIMATED AVERAGE GLUCOSE: 123 MG/DL — HIGH (ref 68–114)
GLUCOSE SERPL-MCNC: 130 MG/DL — HIGH (ref 70–99)
HCT VFR BLD CALC: 39.3 % — SIGNIFICANT CHANGE UP (ref 34.5–45)
HGB BLD-MCNC: 12.4 G/DL — SIGNIFICANT CHANGE UP (ref 11.5–15.5)
MCHC RBC-ENTMCNC: 31 PG — SIGNIFICANT CHANGE UP (ref 27–34)
MCHC RBC-ENTMCNC: 31.6 GM/DL — LOW (ref 32–36)
MCV RBC AUTO: 98.3 FL — SIGNIFICANT CHANGE UP (ref 80–100)
NRBC # BLD: 0 /100 WBCS — SIGNIFICANT CHANGE UP
NRBC # FLD: 0 K/UL — SIGNIFICANT CHANGE UP
PLATELET # BLD AUTO: 223 K/UL — SIGNIFICANT CHANGE UP (ref 150–400)
POTASSIUM SERPL-MCNC: 3.7 MMOL/L — SIGNIFICANT CHANGE UP (ref 3.5–5.3)
POTASSIUM SERPL-SCNC: 3.7 MMOL/L — SIGNIFICANT CHANGE UP (ref 3.5–5.3)
RBC # BLD: 4 M/UL — SIGNIFICANT CHANGE UP (ref 3.8–5.2)
RBC # FLD: 12.3 % — SIGNIFICANT CHANGE UP (ref 10.3–14.5)
SODIUM SERPL-SCNC: 141 MMOL/L — SIGNIFICANT CHANGE UP (ref 135–145)
WBC # BLD: 4.25 K/UL — SIGNIFICANT CHANGE UP (ref 3.8–10.5)
WBC # FLD AUTO: 4.25 K/UL — SIGNIFICANT CHANGE UP (ref 3.8–10.5)

## 2021-01-15 PROCEDURE — 93010 ELECTROCARDIOGRAM REPORT: CPT

## 2021-01-15 RX ORDER — OMEGA-3 ACID ETHYL ESTERS 1 G
1 CAPSULE ORAL
Qty: 0 | Refills: 0 | DISCHARGE

## 2021-01-15 NOTE — H&P PST ADULT - NSANTHOSAYNRD_GEN_A_CORE
No. MARAH screening performed.  STOP BANG Legend: 0-2 = LOW Risk; 3-4 = INTERMEDIATE Risk; 5-8 = HIGH Risk

## 2021-01-15 NOTE — H&P PST ADULT - ASSESSMENT
81 yo female  scheduled for cystoscopy, transurethral resection of instillation of mitomycin with Dr. Mcgowan.

## 2021-01-15 NOTE — H&P PST ADULT - PSYCHIATRIC
Patient negative Affect and characteristics of appearance, verbalizations, behaviors are appropriate

## 2021-01-15 NOTE — H&P PST ADULT - NSICDXPROBLEM_GEN_ALL_CORE_FT
PROBLEM DIAGNOSES  Problem: Bladder cancer  Assessment and Plan: scheduled for cystoscopy, transurethral resection of instillation of mitomycin with Dr. Mcgowan on 01/19/2021.  Verbal and written pre-op instructions provided to patient. Patient verbalized understanding and will call surgeons office for revised instructions if surgery is rescheduled.   Pepcid for GI prophylaxis provided.   Patient aware of need for COVID testing prior to  procedure and advised to coordinate with surgeon.   Patient will obtain medical clearance as per surgeons request-copy requested.     Problem: HTN (hypertension)  Assessment and Plan: Pt. instructed to continue medications as prescribed.     Problem: MARAH and COPD overlap syndrome  Assessment and Plan: MARAH precautions-OR booking notified     Problem: Type 2 diabetes mellitus  Assessment and Plan: Pt. instructed to hold Januvia the morning of procedure. Accucheck DOS. OR booking notified of DM2

## 2021-01-15 NOTE — H&P PST ADULT - NSICDXPASTMEDICALHX_GEN_ALL_CORE_FT
PAST MEDICAL HISTORY:  Bladder cancer     Hyperlipidemia     Hypertension     Obesity     Type 2 diabetes mellitus

## 2021-01-15 NOTE — H&P PST ADULT - HISTORY OF PRESENT ILLNESS
81 yo female with history of HTN, HLD, DM2, obesity presents to PST unit with pre-op diagnosis of neoplasm of unspecified behavior of bladder scheduled for cystoscopy, transurethral resection of instillation of mitomycin with Dr. Mcgowan.

## 2021-01-16 ENCOUNTER — APPOINTMENT (OUTPATIENT)
Dept: DISASTER EMERGENCY | Facility: CLINIC | Age: 81
End: 2021-01-16

## 2021-01-19 ENCOUNTER — APPOINTMENT (OUTPATIENT)
Dept: UROLOGY | Facility: HOSPITAL | Age: 81
End: 2021-01-19

## 2021-02-05 RX ORDER — DONEPEZIL HYDROCHLORIDE 5 MG/1
5 TABLET ORAL
Qty: 60 | Refills: 5 | Status: ACTIVE | COMMUNITY
Start: 2021-02-05 | End: 1900-01-01

## 2021-02-27 PROBLEM — E78.5 HYPERLIPIDEMIA, UNSPECIFIED: Chronic | Status: ACTIVE | Noted: 2021-01-15

## 2021-02-27 PROBLEM — I10 ESSENTIAL (PRIMARY) HYPERTENSION: Chronic | Status: ACTIVE | Noted: 2021-01-15

## 2021-02-27 PROBLEM — C67.9 MALIGNANT NEOPLASM OF BLADDER, UNSPECIFIED: Chronic | Status: ACTIVE | Noted: 2021-01-15

## 2021-02-27 PROBLEM — E11.9 TYPE 2 DIABETES MELLITUS WITHOUT COMPLICATIONS: Chronic | Status: ACTIVE | Noted: 2021-01-15

## 2021-02-27 PROBLEM — E66.9 OBESITY, UNSPECIFIED: Chronic | Status: ACTIVE | Noted: 2021-01-15

## 2021-03-02 ENCOUNTER — OUTPATIENT (OUTPATIENT)
Dept: OUTPATIENT SERVICES | Facility: HOSPITAL | Age: 81
LOS: 1 days | End: 2021-03-02

## 2021-03-02 VITALS
HEART RATE: 65 BPM | WEIGHT: 188.05 LBS | OXYGEN SATURATION: 98 % | SYSTOLIC BLOOD PRESSURE: 144 MMHG | RESPIRATION RATE: 20 BRPM | TEMPERATURE: 98 F | HEIGHT: 62 IN | DIASTOLIC BLOOD PRESSURE: 72 MMHG

## 2021-03-02 DIAGNOSIS — I10 ESSENTIAL (PRIMARY) HYPERTENSION: ICD-10-CM

## 2021-03-02 DIAGNOSIS — E11.9 TYPE 2 DIABETES MELLITUS WITHOUT COMPLICATIONS: ICD-10-CM

## 2021-03-02 DIAGNOSIS — D49.4 NEOPLASM OF UNSPECIFIED BEHAVIOR OF BLADDER: ICD-10-CM

## 2021-03-02 DIAGNOSIS — C67.9 MALIGNANT NEOPLASM OF BLADDER, UNSPECIFIED: ICD-10-CM

## 2021-03-02 DIAGNOSIS — Z01.812 ENCOUNTER FOR PREPROCEDURAL LABORATORY EXAMINATION: ICD-10-CM

## 2021-03-02 DIAGNOSIS — E03.9 HYPOTHYROIDISM, UNSPECIFIED: ICD-10-CM

## 2021-03-02 LAB
A1C WITH ESTIMATED AVERAGE GLUCOSE RESULT: 5.8 % — HIGH (ref 4–5.6)
ANION GAP SERPL CALC-SCNC: 10 MMOL/L — SIGNIFICANT CHANGE UP (ref 7–14)
BUN SERPL-MCNC: 22 MG/DL — SIGNIFICANT CHANGE UP (ref 7–23)
CALCIUM SERPL-MCNC: 9.9 MG/DL — SIGNIFICANT CHANGE UP (ref 8.4–10.5)
CHLORIDE SERPL-SCNC: 106 MMOL/L — SIGNIFICANT CHANGE UP (ref 98–107)
CO2 SERPL-SCNC: 27 MMOL/L — SIGNIFICANT CHANGE UP (ref 22–31)
CREAT SERPL-MCNC: 1.14 MG/DL — SIGNIFICANT CHANGE UP (ref 0.5–1.3)
ESTIMATED AVERAGE GLUCOSE: 120 MG/DL — HIGH (ref 68–114)
GLUCOSE SERPL-MCNC: 166 MG/DL — HIGH (ref 70–99)
HCT VFR BLD CALC: 38.4 % — SIGNIFICANT CHANGE UP (ref 34.5–45)
HGB BLD-MCNC: 12.1 G/DL — SIGNIFICANT CHANGE UP (ref 11.5–15.5)
MCHC RBC-ENTMCNC: 30.5 PG — SIGNIFICANT CHANGE UP (ref 27–34)
MCHC RBC-ENTMCNC: 31.5 GM/DL — LOW (ref 32–36)
MCV RBC AUTO: 96.7 FL — SIGNIFICANT CHANGE UP (ref 80–100)
NRBC # BLD: 0 /100 WBCS — SIGNIFICANT CHANGE UP
NRBC # FLD: 0 K/UL — SIGNIFICANT CHANGE UP
PLATELET # BLD AUTO: 221 K/UL — SIGNIFICANT CHANGE UP (ref 150–400)
POTASSIUM SERPL-MCNC: 4.2 MMOL/L — SIGNIFICANT CHANGE UP (ref 3.5–5.3)
POTASSIUM SERPL-SCNC: 4.2 MMOL/L — SIGNIFICANT CHANGE UP (ref 3.5–5.3)
RBC # BLD: 3.97 M/UL — SIGNIFICANT CHANGE UP (ref 3.8–5.2)
RBC # FLD: 12.1 % — SIGNIFICANT CHANGE UP (ref 10.3–14.5)
SODIUM SERPL-SCNC: 143 MMOL/L — SIGNIFICANT CHANGE UP (ref 135–145)
WBC # BLD: 2.8 K/UL — LOW (ref 3.8–10.5)
WBC # FLD AUTO: 2.8 K/UL — LOW (ref 3.8–10.5)

## 2021-03-02 NOTE — H&P PST ADULT - NSICDXPASTMEDICALHX_GEN_ALL_CORE_FT
PAST MEDICAL HISTORY:  Bladder cancer     Hyperlipidemia     Hypertension     Obesity     Type 2 diabetes mellitus      PAST MEDICAL HISTORY:  Bladder cancer     Hyperlipidemia     Hypertension     Hypothyroidism     Obesity     Type 2 diabetes mellitus

## 2021-03-02 NOTE — H&P PST ADULT - HISTORY OF PRESENT ILLNESS
79 yo female with history of HTN, HLD, DM2, obesity presents to PST unit with pre-op diagnosis of neoplasm of unspecified behavior of bladder scheduled for cystoscopy, transurethral resection of instillation of mitomycin with Dr. Mcgowan.  81 yo female with history of HTN, HLD, DM2, obesity presents to PST unit with pre-op diagnosis of neoplasm of unspecified behavior of bladder scheduled for cystoscopy, transurethral resection of instillation of mitomycin with Dr. Mcgowan on 1/19/2021.   Procedure was cancelled by PCP due to elevated b/p.  Now rescheduled for 3/16/2021.

## 2021-03-02 NOTE — H&P PST ADULT - NSICDXFAMILYHX_GEN_ALL_CORE_FT
FAMILY HISTORY:  Family hx of hypertension, father  FH: type 2 diabetes mellitus, father & daughter  FHx: heart disease, daughter

## 2021-03-02 NOTE — H&P PST ADULT - NSICDXPROBLEM_GEN_ALL_CORE_FT
PROBLEM DIAGNOSES  Problem: Bladder cancer  Assessment and Plan: scheduled for cystoscopy, transurethral resection of instillation of mitomycin with Dr. Mcgowan on 01/19/2021.  Verbal and written pre-op instructions provided to patient. Patient verbalized understanding and will call surgeons office for revised instructions if surgery is rescheduled.   Pepcid for GI prophylaxis provided.   Patient aware of need for COVID testing prior to  procedure and advised to coordinate with surgeon.   Patient will obtain medical clearance as per surgeons request-copy requested.     Problem: HTN (hypertension)  Assessment and Plan: Pt. instructed to continue medications as prescribed.     Problem: MARAH and COPD overlap syndrome  Assessment and Plan: MARAH precautions-OR booking notified     Problem: Type 2 diabetes mellitus  Assessment and Plan: Pt. instructed to hold Januvia the morning of procedure. Accucheck DOS. OR booking notified of DM2        PROBLEM DIAGNOSES  Problem: Hypothyroidism  Assessment and Plan: Pt instructed to take  levithyroxine on morning of surgery.   Tanja precautions recommended.  OR booking notified via fax.      Problem: Bladder cancer  Assessment and Plan: Sscheduled for cystoscopy, transurethral resection of instillation of mitomycin on 3/16/2021  Written & verbal preop instructions, gi prophylaxis   Pt verbalized good understanding.   .    Problem: Hypertension  Assessment and Plan: Pt instructed to take Ibersartan, labetalol & nifedipine on morning of surgery.  Pending copy of medical eval      Problem: Type 2 diabetes mellitus  Assessment and Plan: Pt. instructed to hold Januvia the morning of procedure. Accucheck DOS. OR booking notified of DM2       Problem: Encounter for preprocedure screening laboratory testing for COVID-19  Assessment and Plan: per pt scheduled within 72 hrs of this surgery

## 2021-03-03 LAB
CULTURE RESULTS: SIGNIFICANT CHANGE UP
SPECIMEN SOURCE: SIGNIFICANT CHANGE UP

## 2021-03-12 ENCOUNTER — LABORATORY RESULT (OUTPATIENT)
Age: 81
End: 2021-03-12

## 2021-03-13 ENCOUNTER — LABORATORY RESULT (OUTPATIENT)
Age: 81
End: 2021-03-13

## 2021-03-13 ENCOUNTER — APPOINTMENT (OUTPATIENT)
Dept: DISASTER EMERGENCY | Facility: CLINIC | Age: 81
End: 2021-03-13

## 2021-03-15 ENCOUNTER — TRANSCRIPTION ENCOUNTER (OUTPATIENT)
Age: 81
End: 2021-03-15

## 2021-03-15 NOTE — ASU PATIENT PROFILE, ADULT - PMH
Bladder cancer    Hyperlipidemia    Hypertension    Hypothyroidism    Obesity    Type 2 diabetes mellitus

## 2021-03-16 ENCOUNTER — OUTPATIENT (OUTPATIENT)
Dept: OUTPATIENT SERVICES | Facility: HOSPITAL | Age: 81
LOS: 1 days | Discharge: HOME | End: 2021-03-16
Payer: COMMERCIAL

## 2021-03-16 ENCOUNTER — NON-APPOINTMENT (OUTPATIENT)
Age: 81
End: 2021-03-16

## 2021-03-16 ENCOUNTER — RESULT REVIEW (OUTPATIENT)
Age: 81
End: 2021-03-16

## 2021-03-16 ENCOUNTER — APPOINTMENT (OUTPATIENT)
Dept: UROLOGY | Facility: HOSPITAL | Age: 81
End: 2021-03-16

## 2021-03-16 VITALS
RESPIRATION RATE: 17 BRPM | HEIGHT: 62 IN | TEMPERATURE: 99 F | OXYGEN SATURATION: 98 % | DIASTOLIC BLOOD PRESSURE: 67 MMHG | WEIGHT: 188.05 LBS | SYSTOLIC BLOOD PRESSURE: 98 MMHG | HEART RATE: 68 BPM

## 2021-03-16 VITALS
SYSTOLIC BLOOD PRESSURE: 144 MMHG | RESPIRATION RATE: 14 BRPM | HEART RATE: 74 BPM | OXYGEN SATURATION: 98 % | DIASTOLIC BLOOD PRESSURE: 68 MMHG

## 2021-03-16 DIAGNOSIS — D49.4 NEOPLASM OF UNSPECIFIED BEHAVIOR OF BLADDER: ICD-10-CM

## 2021-03-16 LAB — GLUCOSE BLDC GLUCOMTR-MCNC: 108 MG/DL — HIGH (ref 70–99)

## 2021-03-16 PROCEDURE — 88307 TISSUE EXAM BY PATHOLOGIST: CPT | Mod: 26

## 2021-03-16 PROCEDURE — 52235 CYSTOSCOPY AND TREATMENT: CPT

## 2021-03-16 RX ORDER — SITAGLIPTIN 50 MG/1
1 TABLET, FILM COATED ORAL
Qty: 0 | Refills: 0 | DISCHARGE

## 2021-03-16 RX ORDER — NIFEDIPINE 30 MG
1 TABLET, EXTENDED RELEASE 24 HR ORAL
Qty: 0 | Refills: 0 | DISCHARGE

## 2021-03-16 RX ORDER — MITOMYCIN 5 MG/10ML
40 INJECTION, POWDER, LYOPHILIZED, FOR SOLUTION INTRAVENOUS ONCE
Refills: 0 | Status: COMPLETED | OUTPATIENT
Start: 2021-03-16 | End: 2021-03-16

## 2021-03-16 RX ORDER — DONEPEZIL HYDROCHLORIDE 10 MG/1
1 TABLET, FILM COATED ORAL
Qty: 0 | Refills: 0 | DISCHARGE

## 2021-03-16 RX ORDER — TROSPIUM CHLORIDE 20 MG/1
1 TABLET, FILM COATED ORAL
Qty: 0 | Refills: 0 | DISCHARGE

## 2021-03-16 RX ORDER — LEVOTHYROXINE SODIUM 125 MCG
1 TABLET ORAL
Qty: 0 | Refills: 0 | DISCHARGE

## 2021-03-16 RX ORDER — NYSTATIN CREAM 100000 [USP'U]/G
0 CREAM TOPICAL
Qty: 0 | Refills: 0 | DISCHARGE

## 2021-03-16 RX ORDER — OMEGA-3 ACID ETHYL ESTERS 1 G
2 CAPSULE ORAL
Qty: 0 | Refills: 0 | DISCHARGE

## 2021-03-16 RX ORDER — LABETALOL HCL 100 MG
1 TABLET ORAL
Qty: 0 | Refills: 0 | DISCHARGE

## 2021-03-16 RX ORDER — ASPIRIN/CALCIUM CARB/MAGNESIUM 324 MG
1 TABLET ORAL
Qty: 0 | Refills: 0 | DISCHARGE

## 2021-03-16 RX ORDER — IRBESARTAN 75 MG/1
1 TABLET ORAL
Qty: 0 | Refills: 0 | DISCHARGE

## 2021-03-16 RX ORDER — SODIUM CHLORIDE 9 MG/ML
1000 INJECTION, SOLUTION INTRAVENOUS
Refills: 0 | Status: DISCONTINUED | OUTPATIENT
Start: 2021-03-16 | End: 2021-03-31

## 2021-03-16 RX ORDER — ATORVASTATIN CALCIUM 80 MG/1
1 TABLET, FILM COATED ORAL
Qty: 0 | Refills: 0 | DISCHARGE

## 2021-03-16 RX ADMIN — SODIUM CHLORIDE 125 MILLILITER(S): 9 INJECTION, SOLUTION INTRAVENOUS at 17:52

## 2021-03-16 RX ADMIN — MITOMYCIN 40 MILLIGRAM(S): 5 INJECTION, POWDER, LYOPHILIZED, FOR SOLUTION INTRAVENOUS at 18:15

## 2021-03-16 NOTE — ASU PREOP CHECKLIST - COMMENTS
pt took atorvastatin, irbesartan, pepcid, synthroid,trospium, donepezil and nifedipine at 11 am with a sip of water

## 2021-03-16 NOTE — ASU DISCHARGE PLAN (ADULT/PEDIATRIC) - NURSING INSTRUCTIONS
DO NOT take any Tylenol (Acetaminophen) or narcotics containing Tylenol until after  11pm. You received Tylenol during your operation and it can cause damage to your liver if too much is taken within a 24 hour time period.

## 2021-03-16 NOTE — BRIEF OPERATIVE NOTE - NSICDXBRIEFPROCEDURE_GEN_ALL_CORE_FT
PROCEDURES:  Cystoscopy, with TURBT, with mitomycin C installation 16-Mar-2021 17:52:15  Nate Nino

## 2021-03-16 NOTE — ASU DISCHARGE PLAN (ADULT/PEDIATRIC) - FOLLOW UP APPOINTMENTS
may also call Recovery Room (PACU) 24/7 @ [phone]/[facility]

## 2021-03-16 NOTE — BRIEF OPERATIVE NOTE - OPERATION/FINDINGS
cystoscopy, TURBT. Large, >3cm low grade appearing bladder mass along trigonal ridge, on stalk, midline.

## 2021-03-19 LAB — SURGICAL PATHOLOGY STUDY: SIGNIFICANT CHANGE UP

## 2021-05-11 ENCOUNTER — APPOINTMENT (OUTPATIENT)
Dept: NEUROLOGY | Facility: CLINIC | Age: 81
End: 2021-05-11
Payer: COMMERCIAL

## 2021-05-11 VITALS
HEART RATE: 64 BPM | BODY MASS INDEX: 34.38 KG/M2 | HEIGHT: 63 IN | SYSTOLIC BLOOD PRESSURE: 156 MMHG | WEIGHT: 194 LBS | DIASTOLIC BLOOD PRESSURE: 71 MMHG

## 2021-05-11 VITALS — TEMPERATURE: 96.4 F

## 2021-05-11 DIAGNOSIS — G54.1 TYPE 2 DIABETES MELLITUS WITH DIABETIC AMYOTROPHY: ICD-10-CM

## 2021-05-11 DIAGNOSIS — D49.4 NEOPLASM OF UNSPECIFIED BEHAVIOR OF BLADDER: ICD-10-CM

## 2021-05-11 DIAGNOSIS — E11.44 TYPE 2 DIABETES MELLITUS WITH DIABETIC AMYOTROPHY: ICD-10-CM

## 2021-05-11 DIAGNOSIS — G56.02 CARPAL TUNNEL SYNDROME, LEFT UPPER LIMB: ICD-10-CM

## 2021-05-11 DIAGNOSIS — F03.90 UNSPECIFIED DEMENTIA W/OUT BEHAVIORAL DISTURBANCE: ICD-10-CM

## 2021-05-11 PROBLEM — E03.9 HYPOTHYROIDISM, UNSPECIFIED: Chronic | Status: ACTIVE | Noted: 2021-03-02

## 2021-05-11 PROCEDURE — 99072 ADDL SUPL MATRL&STAF TM PHE: CPT

## 2021-05-11 PROCEDURE — 99214 OFFICE O/P EST MOD 30 MIN: CPT

## 2021-05-12 PROBLEM — F03.90 DEMENTIA, NEUROLOGICAL: Status: ACTIVE | Noted: 2020-10-08

## 2021-05-12 PROBLEM — G56.02 CARPAL TUNNEL SYNDROME OF LEFT WRIST: Status: ACTIVE | Noted: 2018-06-18

## 2021-05-12 PROBLEM — D49.4 BLADDER TUMOR: Status: ACTIVE | Noted: 2020-10-21

## 2021-05-12 PROBLEM — E11.44: Status: ACTIVE | Noted: 2018-05-17

## 2021-05-12 NOTE — PHYSICAL EXAM
[FreeTextEntry1] : General examination is unremarkable.  Vital signs are recorded and normal.  There is no carotid bruit, thyromegaly or lymphadenopathy.  Chest is clear and heart sounds are normal.  Abdomen is soft and there is no tenderness.  Bladder is not distended.  Pedal pulsations are normal and there is no significant lymphedema.\par \par Neurological examination\par \par The patient is alert oriented to time place and person date day and month and remembered the president from [facility] to [facility] forgot for the [facility] male was but remembered Governor Maegan was able to do serial sevens has no right left confusion finger agnosia and was able to follow two-step commands naming was unimpaired and did not show any signs of anxiety or depression was able to repeat initial questionnaires and did not exhibit any language dysfunction or apraxia.\par \par There are no meningeal signs and cranial nerve examination was completely normal.  Motor evaluation revealed normal tone strength and coordination with 5/5 strength in all proximal and distal muscle groups.  Reflexes were symmetric except for absent ankle reflexes and there is no Babinski sign.  There is decreased vibration perception and pin in the feet but posterior column sensations are preserved and there is no dermatomal sensory involvement.  Gait is normal without apraxia or broad-based gait coordination is intact and Romberg sign is negative.

## 2021-05-12 NOTE — DATA REVIEWED
[de-identified] : I reviewed the medical records of her bladder tumor which appears to be benign and will remain under the care of her urologist.

## 2021-05-12 NOTE — HISTORY OF PRESENT ILLNESS
[FreeTextEntry1] : Ms. Esquivel returns for follow-up evaluation since her last evaluation in October 2020 and has been treated for neurological dementia with donepezil 5 mg twice daily and has tolerated it without any side effects but continues to have problems with short-term memory and options were given of other medications but she elected to have increased donepezil and today I prescribed 23 mg oral tablets daily and discussed the side effects of GI discomfort and to take it after meals and sometimes hallucinatory symptoms and explained to call me should there be any.\par \par Today she informed me that she was noted to have bladder cancer but the histology reportedly was benign and has been under the care of her urologist since April 2021.  She has occasional low back pain which is mild intermittent without clear radicular symptoms or claudication and aspirin seems to help and was told that in the event of any severe low back pain radicular symptoms bowel or bladder dysfunction ataxia neurogenic claudication knee buckling she should contact me immediately.  In the past I had suspected diabetic plexopathy but it was safely ruled out and her diabetes is under excellent control on diet and he reached prediabetic status and has hypertension treated with medications.\par \par There is no interim past medical history.\par \par I reviewed her medications and allergies.

## 2021-05-12 NOTE — DISCUSSION/SUMMARY
[FreeTextEntry1] : Opinion–the patient has mild dementia and discussed all side effects and to contact me by phone if that he is any and to take it after meals.  She will remain under the care of her urologist for bladder tumor and was advised good health maintenance of good control of diet vitamin intake proper nutrition moderate exercises and return back for follow-up in approximately 3 months or on a as needed basis particularly if there is any intensification of her low back pain neurogenic claudication or radicular symptoms.  She understands and will comply.

## 2021-05-12 NOTE — REVIEW OF SYSTEMS
[Memory Lapses or Loss] : memory loss [Decr. Concentrating Ability] : decreased concentrating ability [As Noted in HPI] : as noted in HPI [Arthralgias] : arthralgias [Negative] : Heme/Lymph

## 2021-08-11 ENCOUNTER — APPOINTMENT (OUTPATIENT)
Dept: NEUROLOGY | Facility: CLINIC | Age: 81
End: 2021-08-11

## 2022-06-01 ENCOUNTER — RX RENEWAL (OUTPATIENT)
Age: 82
End: 2022-06-01

## 2022-06-01 RX ORDER — DONEPEZIL HYDROCHLORIDE 5 MG/1
5 TABLET ORAL
Qty: 60 | Refills: 5 | Status: ACTIVE | COMMUNITY
Start: 2021-02-17 | End: 1900-01-01

## 2022-07-09 ENCOUNTER — RX RENEWAL (OUTPATIENT)
Age: 82
End: 2022-07-09

## 2022-07-09 RX ORDER — DONEPEZIL HYDROCHLORIDE 23 MG/1
23 TABLET, FILM COATED ORAL
Qty: 90 | Refills: 3 | Status: ACTIVE | COMMUNITY
Start: 2021-05-12 | End: 1900-01-01

## 2022-08-12 NOTE — DISCUSSION/SUMMARY
[FreeTextEntry1] : Opinion-the patient has mild carpal tunnel syndrome and mild diabetic neuropathy but the symptoms are under excellent control with good control of diabetes and medications have arthralgic pain and was advised to continue with her physician including good control of diabetes and had been back for followup in approximately 4-6 months but have also cautioned that in the event of any persistent tingling numbness weakness or any radicular symptoms which were explained to contact me immediately. Education was provided and she is happy since she does not use a cane any longer. On question were answered. Cheiloplasty (Less Than 50%) Text: A decision was made to reconstruct the defect with a  cheiloplasty.  The defect was undermined extensively.  Additional orbicularis oris muscle was excised with a 15 blade scalpel.  The defect was converted into a full thickness wedge, of less than 50% of the vertical height of the lip, to facilite a better cosmetic result.  Small vessels were then tied off with 5-0 monocyrl. The orbicularis oris, superficial fascia, adipose and dermis were then reapproximated.  After the deeper layers were approximated the epidermis was reapproximated with particular care given to realign the vermilion border.

## 2023-02-10 NOTE — H&P PST ADULT - NSANTHTIREDRD_ENT_A_CORE
No Klisyri Counseling:  I discussed with the patient the risks of Klisyri including but not limited to erythema, scaling, itching, weeping, crusting, and pain.

## 2023-06-07 NOTE — H&P PST ADULT - BREASTS
Lets see how things are going in the next 24-48 hours.  Keep an eye on the symptoms and keep doing the rinses BID.  It is worsening please let us know   detailed exam